# Patient Record
Sex: MALE | Race: WHITE
[De-identification: names, ages, dates, MRNs, and addresses within clinical notes are randomized per-mention and may not be internally consistent; named-entity substitution may affect disease eponyms.]

---

## 2019-07-12 ENCOUNTER — HOSPITAL ENCOUNTER (OUTPATIENT)
Dept: HOSPITAL 11 - JP.SDS | Age: 49
Discharge: HOME | End: 2019-07-12
Attending: SURGERY
Payer: COMMERCIAL

## 2019-07-12 DIAGNOSIS — Z12.11: Primary | ICD-10-CM

## 2019-07-12 DIAGNOSIS — I10: ICD-10-CM

## 2019-07-12 DIAGNOSIS — D12.2: ICD-10-CM

## 2019-07-12 DIAGNOSIS — K21.9: ICD-10-CM

## 2019-07-12 DIAGNOSIS — Z79.899: ICD-10-CM

## 2019-07-12 DIAGNOSIS — E66.9: ICD-10-CM

## 2019-07-12 DIAGNOSIS — Z79.82: ICD-10-CM

## 2019-07-12 DIAGNOSIS — E78.5: ICD-10-CM

## 2019-07-12 PROCEDURE — 45380 COLONOSCOPY AND BIOPSY: CPT

## 2019-07-12 NOTE — OR
DATE OF PROCEDURE:  07/12/2019

 

SURGEON:  Ap Hamilton MD

 

PREOPERATIVE DIAGNOSIS:  Colon cancer screening.

 

POSTOPERATIVE DIAGNOSIS:  Small right colon polyp.

 

PROCEDURE PERFORMED:  Colonoscopy to the cecum with biopsy resection of small 
right 

colon polyp.



SURGEON:  Ap Hamilton MD. 

 

ANESTHESIA:  IV anesthesia with monitored anesthesia care.

 

INDICATION:  This 48-year-old white male is referred for a screening 
colonoscopy.  He has

never had a colonoscopic exam.  I counseled him for the procedure including 
risks and

alternatives, and he gave his informed consent to proceed.

 

DESCRIPTION OF PROCEDURE:  The patient was placed in the left lateral decubitus 
position.

IV anesthesia was administered by the Anesthesia Service.  Time-out was held.  
A rectal 

exam was performed, which was unremarkable.  The flexible video Olympus 
colonoscope was

introduced through his anus, up his rectum, and out his colon all the way to 
the cecum.  En

route, in the right colon, we saw a small polyp which was removed with a few 
bites of the

biopsy forceps.  Once the cecum was reached, the scope was slowly withdrawn, 
examining 

the mucosa throughout.  No additional mucosal abnormalities were noted.  The 
scope was

retroflexed in the rectum with the distal rectum appearing unremarkable.  The 
scope was

straightened and removed.  He tolerated the procedure well.

 

 

 

 

Ap Hamilton MD

DD:  07/12/2019 09:02:37

DT:  07/12/2019 09:10:21

Job #:  [id number]

MTDD

## 2020-08-17 NOTE — EDM.PDOC
ED HPI GENERAL MEDICAL PROBLEM





- General


Chief Complaint: Neurological Problem


Stated Complaint: POSSIBLE VERTIGO


Time Seen by Provider: 08/17/20 19:37


Source of Information: Reports: Patient


History Limitations: Reports: No Limitations





- History of Present Illness


INITIAL COMMENTS - FREE TEXT/NARRATIVE: 





50 yo male presents to ER following 2 episodes of room spinning dizziness.  

Earlier this evening he was walking up the stairs in his house and the room 

began to spin.  He sat in a chair for a few minutes it resolved.  He mowed the 

lawn and worked outside.  After he came in from mowing her had another episode 

of room spinning dizziness and had an episode of diarrhea and nausea with 

vomiting.  Symptoms are almost resolved on arrival to ER.  He can "not feel 

right" if he quickly moves his head.  Wife also reports that over the last few 

months he has had an increase in short term memory forgetfulness.  He does 

report work stress.  denies getting lost.    





- Related Data


                                    Allergies











Allergy/AdvReac Type Severity Reaction Status Date / Time


 


No Known Allergies Allergy   Verified 08/17/20 19:52











Home Meds: 


                                    Home Meds





Aspirin [Halfprin] 81 mg PO DAILY 07/10/19 [History]


Glucosam/Chond-Msm1/C/Rick/Bor [Glucosa-Chond-MSM Complex Cplt] 2 cap PO DAILY 

07/10/19 [History]


atorvaSTATin [Lipitor] 40 mg PO BEDTIME 07/10/19 [History]











Past Medical History


HEENT History: Reports: Impaired Vision


Cardiovascular History: Reports: High Cholesterol, Hypertension


Musculoskeletal History: Reports: Fracture


Other Musculoskeletal History: left wrist,right ankle


Dermatologic History: Reports: Eczema





- Infectious Disease History


Infectious Disease History: Reports: Chicken Pox





- Past Surgical History


Head Surgeries/Procedures: Reports: None


HEENT Surgical History: Reports: None


Cardiovascular Surgical History: Reports: None


Musculoskeletal Surgical History: Reports: None


Dermatological Surgical History: Reports: None





Social & Family History





- Family History


Family Medical History: Noncontributory





- Tobacco Use


Smoking Status *Q: Former Smoker


Used Tobacco, but Quit: Yes


Month/Year Tobacco Last Used: 2018





- Caffeine Use


Caffeine Use: Reports: Soda





- Alcohol Use


Days Per Week of Alcohol Use: 5


Number of Drinks Per Day: 2


Total Drinks Per Week: 10





- Recreational Drug Use


Recreational Drug Use: No





ED ROS GENERAL





- Review of Systems


Review Of Systems: See Below


Constitutional: Denies: Fever, Chills


Respiratory: Denies: Shortness of Breath, Wheezing


Cardiovascular: Denies: Chest Pain, Palpitations


GI/Abdominal: Denies: Abdominal Pain


Neurological: Reports: Dizziness.  Denies: Headache, Syncope





ED EXAM, NEURO





- Physical Exam


Exam: See Below


Exam Limited By: No Limitations


General Appearance: Alert, WD/WN, No Apparent Distress


Eye Exam: Bilateral Eye: EOMI, PERRL


Ears: Normal External Exam, Normal Canal, Hearing Grossly Normal, Normal TMs


Nose: Normal Inspection, Normal Mucosa, No Blood


Throat/Mouth: Normal Inspection, Normal Lips, Normal Teeth, Normal Gums, Normal 

Oropharynx


Head Exam: Atraumatic, Normocephalic


Neck: Normal Inspection, Supple, Non-Tender.  No: Lymphadenopathy (R), 

Lymphadenopathy (L)


Respiratory/Chest: No Respiratory Distress, Lungs Clear, Normal Breath Sounds.  

No: Crackles, Rhonchi, Wheezing


Cardiovascular: Regular Rate, Rhythm, No Murmur


Neurological: Alert, Normal Mood/Affect, CN II-XII Intact, Normal Gait, Oriented

 x 3


Psychiatric: Normal Affect, Normal Mood


Skin Exam: Warm, Dry, Intact, Rash (vesicular rash on legs and right eyelid)





Course





- Vital Signs


Last Recorded V/S: 





                                Last Vital Signs











Temp  36.3 C   08/17/20 19:57


 


Pulse  70   08/17/20 19:57


 


Resp  11 L  08/17/20 19:57


 


BP  150/93 H  08/17/20 19:57


 


Pulse Ox  99   08/17/20 19:57














- Orders/Labs/Meds


Orders: 





                               Active Orders 24 hr











 Category Date Time Status


 


 Meclizine [Antivert] Med  08/17/20 20:19 Once





 25 mg PO ONETIME ONE   











Meds: 





Medications














Discontinued Medications














Generic Name Dose Route Start Last Admin





  Trade Name Freq  PRN Reason Stop Dose Admin


 


Lidocaine HCl  5 ml  08/17/20 20:04 





  Xylocaine-Mpf 1%  INJECT  08/17/20 20:05 





  ONETIME ONE  














- Re-Assessments/Exams


Free Text/Narrative Re-Assessment/Exam: 





08/17/20 20:34


antivert for resolution of symptoms as needed


discussed stress and sleep in association with memory loss and encouraged 

follow-up with pCP 





Departure





- Departure


Time of Disposition: 20:35


Disposition: Home, Self-Care 01


Condition: Good


Clinical Impression: 


 Vertigo, Poison ivy dermatitis








- Discharge Information


*PRESCRIPTION DRUG MONITORING PROGRAM REVIEWED*: Not Applicable


*COPY OF PRESCRIPTION DRUG MONITORING REPORT IN PATIENT VICTOR MANUEL: Not Applicable


Instructions:  Poison Ivy Dermatitis, Easy-to-Read


Referrals: 


Sudha Holland PA-C [Primary Care Provider] - 


Additional Instructions: 


maintain hydration with half your body weight in ounces daily


meclizine 25 mg every 8 hours as needed for vertigo





follow-up with primary care provider to talk about memory issues in association 

with sleep - discuss day time sleepiness and a sleep study





follow-up if the dizziness does not resolve.  return if you begin to have word 

finding problems, headache, unilateral numbness or disfunction





Sepsis Event Note (ED)





- Evaluation


Sepsis Screening Result: No Definite Risk





- Focused Exam


Vital Signs: 





                                   Vital Signs











  Temp Pulse Resp BP Pulse Ox


 


 08/17/20 19:57  36.3 C  70  11 L  150/93 H  99


 


 08/17/20 19:51  36.3 C  70  11 L  150/93 H  99














- My Orders


Last 24 Hours: 





My Active Orders





08/17/20 20:19


Meclizine [Antivert]   25 mg PO ONETIME ONE 














- Assessment/Plan


Last 24 Hours: 





My Active Orders





08/17/20 20:19


Meclizine [Antivert]   25 mg PO ONETIME ONE